# Patient Record
Sex: FEMALE
[De-identification: names, ages, dates, MRNs, and addresses within clinical notes are randomized per-mention and may not be internally consistent; named-entity substitution may affect disease eponyms.]

---

## 2021-12-22 ENCOUNTER — HOSPITAL ENCOUNTER (EMERGENCY)
Dept: HOSPITAL 5 - ED | Age: 51
Discharge: LEFT BEFORE DISCHARGE | End: 2021-12-22
Payer: SELF-PAY

## 2021-12-22 VITALS — DIASTOLIC BLOOD PRESSURE: 71 MMHG | SYSTOLIC BLOOD PRESSURE: 112 MMHG

## 2021-12-22 DIAGNOSIS — B34.9: Primary | ICD-10-CM

## 2021-12-22 DIAGNOSIS — R53.81: ICD-10-CM

## 2021-12-22 PROCEDURE — 99282 EMERGENCY DEPT VISIT SF MDM: CPT

## 2021-12-22 NOTE — EMERGENCY DEPARTMENT REPORT
ED General Adult HPI





- General


Chief complaint: Upper Respiratory Infection


Stated complaint: FLU


Time Seen by Provider: 12/22/21 16:29


Source: patient


Mode of arrival: Ambulatory


Limitations: No Limitations





- History of Present Illness


Initial comments: 





50-year-old -American female patient presents with complaints of chills, 

nausea, and mild cough and malaise for the past 5 days.  She denies any loss of 

taste or smell, shortness of breath, hemoptysis, or chest pain.  No vomiting or 

diarrhea or abdominal pain per patient.  Patient states "I believe I have the 

flu".  She denies being vaccinated or tested for COVID-19.  No past medical 

history per patient.  She states she is still eating and drinking without 

difficulty


Severity scale (0 -10): 8





- Related Data


                                  Previous Rx's











 Medication  Instructions  Recorded  Last Taken  Type


 


Ibuprofen [Motrin 600 MG tab] 600 mg PO Q8H PRN #15 tablet 12/22/21 Unknown Rx


 


Ondansetron [Zofran Odt] 4 mg PO Q8HR PRN #12 tab.rapdis 12/22/21 Unknown Rx











                                    Allergies











Allergy/AdvReac Type Severity Reaction Status Date / Time


 


No Known Allergies Allergy   Unverified 12/22/21 15:10














ED Review of Systems


ROS: 


Stated complaint: FLU


Other details as noted in HPI





Constitutional: chills, malaise.  denies: diaphoresis, fever, weakness


ENT: denies: throat pain


Respiratory: cough.  denies: shortness of breath


Cardiovascular: denies: chest pain


Gastrointestinal: nausea.  denies: abdominal pain, vomiting, diarrhea


Genitourinary: denies: urgency, dysuria, frequency, hematuria


Skin: denies: rash, lesions, change in color


Neurological: denies: headache


Hematological/Lymphatic: denies: swollen glands





ED Past Medical Hx





- Medications


Home Medications: 


                                Home Medications











 Medication  Instructions  Recorded  Confirmed  Last Taken  Type


 


Ibuprofen [Motrin 600 MG tab] 600 mg PO Q8H PRN #15 tablet 12/22/21  Unknown Rx


 


Ondansetron [Zofran Odt] 4 mg PO Q8HR PRN #12 tab.rapdis 12/22/21  Unknown Rx














ED Physical Exam





- General


Limitations: No Limitations


General appearance: alert, in no apparent distress





- Head


Head exam: Present: atraumatic, normocephalic





- Eye


Eye exam: Present: normal appearance





- Neck


Neck exam: Present: normal inspection





- Respiratory


Respiratory exam: Present: normal lung sounds bilaterally.  Absent: respiratory 

distress





- Cardiovascular


Cardiovascular Exam: Present: regular rate, normal rhythm.  Absent: systolic 

murmur, diastolic murmur, rubs, gallop





- GI/Abdominal


GI/Abdominal exam: Present: soft.  Absent: tenderness





- Neurological Exam


Neurological exam: Present: alert, oriented X3





- Psychiatric


Psychiatric exam: Present: normal affect, normal mood





- Skin


Skin exam: Present: warm, dry, intact, normal color.  Absent: rash





ED Course


                                   Vital Signs











  12/22/21





  15:10


 


Temperature 98.5 F


 


Pulse Rate 100 H


 


Respiratory 18





Rate 


 


Blood Pressure 112/71





[Right] 


 


O2 Sat by Pulse 98





Oximetry 














ED Medical Decision Making





- Medical Decision Making








50-year-old -American female patient presents with complaints of chills, 

nausea, and mild cough and malaise for the past 5 days.  She denies any loss of 

taste or smell, shortness of breath, hemoptysis, or chest pain.  No vomiting or 

diarrhea or abdominal pain per patient.  Patient states "I believe I have the 

flu".  She denies being vaccinated or tested for COVID-19.  No past medical 

history per patient.  She states she is still eating and drinking without 

difficulty





I was informed by the triage technician that the patient refused her rapid flu 

test and eloped from the ED prior to disposition


Critical care attestation.: 


If time is entered above; I have spent that time in minutes in the direct care 

of this critically ill patient, excluding procedure time.








ED Disposition


Clinical Impression: 


 Viral syndrome





Disposition: 07 LEFT AWOL/ELOPED


Is pt being admited?: No


Condition: Stable


Instructions:  Viral Illness, Adult


Prescriptions: 


Ibuprofen [Motrin 600 MG tab] 600 mg PO Q8H PRN #15 tablet


 PRN Reason: Pain/fever


Ondansetron [Zofran Odt] 4 mg PO Q8HR PRN #12 tab.rapdis


 PRN Reason: Nausea


Referrals: 


PRIMARY CARE,MD [Primary Care Provider] - 3-5 Days


Forms:  Work/School Release Form(ED)